# Patient Record
Sex: FEMALE | Race: WHITE | NOT HISPANIC OR LATINO | Employment: FULL TIME | URBAN - METROPOLITAN AREA
[De-identification: names, ages, dates, MRNs, and addresses within clinical notes are randomized per-mention and may not be internally consistent; named-entity substitution may affect disease eponyms.]

---

## 2017-04-17 ENCOUNTER — HOSPITAL ENCOUNTER (EMERGENCY)
Facility: HOSPITAL | Age: 47
Discharge: HOME | End: 2017-04-17

## 2017-04-17 VITALS
RESPIRATION RATE: 16 BRPM | TEMPERATURE: 98 F | HEART RATE: 87 BPM | DIASTOLIC BLOOD PRESSURE: 97 MMHG | WEIGHT: 150 LBS | OXYGEN SATURATION: 96 % | BODY MASS INDEX: 28.32 KG/M2 | HEIGHT: 61 IN | SYSTOLIC BLOOD PRESSURE: 137 MMHG

## 2017-04-17 DIAGNOSIS — M79.671 ACUTE FOOT PAIN, RIGHT: ICD-10-CM

## 2017-04-17 PROCEDURE — 99283 EMERGENCY DEPT VISIT LOW MDM: CPT

## 2017-04-17 RX ORDER — HYDROCODONE BITARTRATE AND ACETAMINOPHEN 7.5; 325 MG/1; MG/1
1 TABLET ORAL EVERY 6 HOURS PRN
Qty: 6 TABLET | Refills: 0 | Status: SHIPPED | OUTPATIENT
Start: 2017-04-17

## 2017-04-17 NOTE — ED AVS SNAPSHOT
[facility]  [address]  [place] [address]               Deana Nielsen   2017  9:48 AM   ED    Description:  Female : 1970   Department:  [facility]            Your Care was Coordinated By:     Provider Role From To    Korina Murphy PA-C Physician Assistant 17 0948 --      Reason for Visit     Foot Pain           Diagnoses this Visit        Comments    Acute foot pain, right           ED Disposition     ED Disposition Condition Comment    Discharge             To Do List           Follow-up Information     Follow up with Jojo Cordero MD In 2 days.    Specialty:  Internal Medicine    Contact information:    [address]  [facility] [address]  [phone]         These Medications        Disp Refills Start End    hydrocodone-acetaminophen 7.5-325mg (NORCO) 7.5-325 mg per tablet 6 tablet 0 2017     Take 1 tablet by mouth every 6 (six) hours as needed for Pain. - Oral      [facility]     [facility] -  Assistance  Unless otherwise directed by your provider, please contact [facility], our nurse care line that is available for  assistance.     Registered nurses in the [facility] provide: appointment scheduling, clinical advisement, health education, and other advisory services.  Call: [phone] (toll free)               Medications           START taking these NEW medications        Refills    hydrocodone-acetaminophen 7.5-325mg (NORCO) 7.5-325 mg per tablet 0    Sig: Take 1 tablet by mouth every 6 (six) hours as needed for Pain.    Class: Print    Route: Oral      STOP taking these medications     hydrocodone-acetaminophen 5-325mg (NORCO) 5-325 mg per tablet Take 1 tablet by mouth every 4 (four) hours as needed.           Verify that the below list of medications is an accurate representation of the medications you are currently taking.  If none  "reported, the list may be blank. If incorrect, please contact your healthcare provider. Carry this list with you in case of emergency.           Current Medications     ferrous gluconate (FERGON) 325 MG Tab Take 1 tablet (325 mg total) by mouth 2 (two) times daily with meals.    hydrocodone-acetaminophen 7.5-325mg (NORCO) 7.5-325 mg per tablet Take 1 tablet by mouth every 6 (six) hours as needed for Pain.           Clinical Reference Information           Your Vitals Were     BP Pulse Temp Resp Height Weight    137/97 (BP Location: Right arm, Patient Position: Sitting) 87 98 °F (36.7 °C) (Oral) 16 5' 1" (1.549 m) 68 kg (150 lb)    SpO2 BMI             96% 28.34 kg/m2         Allergies as of 4/17/2017        Reactions    Nsaids (Non-steroidal Anti-inflammatory Drug)     Hives      Immunizations Administered on Date of Encounter - 4/17/2017     None      ED Micro, Lab, POCT     None      ED Imaging Orders     Start Ordered       Status Ordering Provider    04/17/17 1005 04/17/17 1004  X-Ray Foot Complete Right  1 time imaging      Final result       Discharge References/Attachments     ARTHRALGIA (ENGLISH)      Smoking Cessation     If you would like to quit smoking:   You may be eligible for free services if you are a [place] resident and started smoking cigarettes before September 1, 1988.  Call the Smoking Cessation [employer] ([facility]) toll free at [phone] or [phone].   Call 6-023-QUIT-NOW if you do not meet the above criteria.   Contact us via email: [email or web address]   View our website for more information: [email or web address]         [facility]  complies with applicable Federal civil rights laws and does not discriminate on the basis of race, color, national origin, age, disability, or sex.        Language Assistance Services     ATTENTION: Language assistance services are available, free of charge. Please call [phone].      ATENCIÓN: laith Wong " disposición servicios gratuitos de asistencia lingüística. Lldee al [phone].     MARYURI Ý: N?u b?n nói Ti?ng Vi?t, có các d?ch v? h? tr? ngôn ng? mi?n phí dành cho b?n. G?i s? [phone].

## 2017-04-17 NOTE — ED NOTES
Patient examined, evaluated, and educated on discharge prescriptions and instructions by LATOYA. [facility]

## 2017-04-17 NOTE — ED PROVIDER NOTES
History      Chief Complaint   Patient presents with    Foot Pain     woke up yesterday with right foot pain, denies trauma       Review of patient's allergies indicates:   Allergen Reactions    Nsaids (non-steroidal anti-inflammatory drug)      Hives          HPI   HPI    4/17/2017, 10:03 AM   History obtained from the patient      History of Present Illness: Deana Nielsen is a 47 y.o. female patient who presents to the Emergency Department for right foot pain since waking yesterday.  Denies injury, fever, or hx of same. Symptoms are moderate in severity.     No further complaints or concerns at this time.           PCP: Jojo Cordero MD       Past Medical History:  Past Medical History:   Diagnosis Date    Menorrhagia     Polycystic ovarian disease     Rash          Past Surgical History:  Past Surgical History:   Procedure Laterality Date    HAND SURGERY      RIGHT     INNER EAR SURGERY             Family History:  Family History   Problem Relation Age of Onset    Multiple sclerosis Sister            Social History:  Social History     Social History Main Topics    Smoking status: Former Smoker    Smokeless tobacco: Not on file    Alcohol use No    Drug use: No    Sexual activity: Not on file       ROS     Review of Systems   Constitutional: Negative for chills and fever.   HENT: Negative for sore throat.    Respiratory: Negative for shortness of breath.    Cardiovascular: Negative for chest pain.   Gastrointestinal: Negative for nausea.   Genitourinary: Negative for dysuria.   Musculoskeletal: Negative for back pain.   Skin: Negative for rash.   Neurological: Negative for weakness.   Hematological: Does not bruise/bleed easily.   All other systems reviewed and are negative.      Physical Exam      Initial Vitals   BP Pulse Resp Temp SpO2   04/17/17 0931 04/17/17 0931 04/17/17 0931 04/17/17 0931 04/17/17 0931   137/97 87 16 98 °F (36.7 °C) 96 %     Physical Exam  Vital signs and nursing  "notes reviewed.  Constitutional: Patient is in NAD. Awake and alert. Well-developed and well-nourished.  Head: Atraumatic. Normocephalic.  Eyes: PERRL. EOM intact. Conjunctivae nl. No scleral icterus.  ENT: Mucous membranes are moist. Oropharynx is clear.  Neck: Supple. No JVD. No lymphadenopathy.  No meningismus  Cardiovascular: Regular rate and rhythm. No murmurs, rubs, or gallops. Distal pulses are 2+ and symmetric.  Pulmonary/Chest: No respiratory distress. Clear to auscultation bilaterally. No wheezing, rales, or rhonchi.  Abdominal: Soft. Non-distended. No TTP. No rebound, guarding, or rigidity. Good bowel sounds.  Genitourinary: No CVA tenderness  Musculoskeletal: Moves all extremities.   Right foot with mild lateral edema.  No erythema or heat.  No break in skin.  TTP of dorsum of midfoot.  FROM of ankle and toes x 5.  2+ distal pulses.  No calf edema or tenderness.  Skin: Warm and dry.  Neurological: Awake and alert. No acute focal neurological deficits are appreciated.  Psychiatric: Normal affect. Good eye contact. Appropriate in content.      ED Course          Procedures  ED Vital Signs:  Vitals:    04/17/17 0931   BP: (!) 137/97   Pulse: 87   Resp: 16   Temp: 98 °F (36.7 °C)   TempSrc: Oral   SpO2: 96%   Weight: 68 kg (150 lb)   Height: 5' 1" (1.549 m)                 Imaging Results:  Imaging Results         X-Ray Foot Complete Right (Final result) Result time:  04/17/17 10:31:40    Final result by CAMILO Patrick Sr., MD (04/17/17 10:31:40)    Impression:         Normal study.      Electronically signed by: CAMILO PATRICK MD  Date:     04/17/17  Time:    10:31     Narrative:    3 view x-ray of the right foot    Clinical History:     Pain in right foot    Findings:     There is no fracture. There is no dislocation.                 The Emergency Provider reviewed the vital signs and test results, which are outlined above.    ED Discussion             Medication(s) given in the ER:  Medications - No data to " display        Follow-up Information     Follow up with Jojo Cordero MD In 2 days.    Specialty:  Internal Medicine    Contact information:    [address]  [facility] [address]  [phone]                Discharge Medication List as of 4/17/2017 10:46 AM      START taking these medications    Details   hydrocodone-acetaminophen 7.5-325mg (NORCO) 7.5-325 mg per tablet Take 1 tablet by mouth every 6 (six) hours as needed for Pain., Starting 4/17/2017, Until Discontinued, Print                Medical Decision Making        Rx crutches to facilitate discharge of patient on very busy day in ER.  She says she would rather rx crutches than wait.  All findings were reviewed with the patient/family in detail.   All remaining questions and concerns were addressed at that time.  Patient/family has been counseled regarding the need for follow-up as well as the indication to return to the emergency room should new or worrisome developments occur.        MDM               Clinical Impression:        ICD-10-CM ICD-9-CM   1. Acute foot pain, right M79.671 729.5             Korina Murphy PA-C  04/17/17 1803

## 2020-06-22 ENCOUNTER — HOSPITAL ENCOUNTER (EMERGENCY)
Facility: HOSPITAL | Age: 50
Discharge: HOME | End: 2020-06-22
Attending: FAMILY MEDICINE

## 2020-06-22 VITALS
BODY MASS INDEX: 28.34 KG/M2 | HEART RATE: 89 BPM | WEIGHT: 150 LBS | RESPIRATION RATE: 18 BRPM | TEMPERATURE: 98 F | SYSTOLIC BLOOD PRESSURE: 130 MMHG | DIASTOLIC BLOOD PRESSURE: 82 MMHG | OXYGEN SATURATION: 99 %
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
## 2020-06-22 DIAGNOSIS — S93.401A SPRAIN OF RIGHT ANKLE, UNSPECIFIED LIGAMENT, INITIAL ENCOUNTER: Primary | ICD-10-CM

## 2020-06-22 PROCEDURE — 63600175 PHARM REV CODE 636 W HCPCS: Performed by: NURSE PRACTITIONER

## 2020-06-22 PROCEDURE — 96372 THER/PROPH/DIAG INJ SC/IM: CPT

## 2020-06-22 PROCEDURE — 99284 EMERGENCY DEPT VISIT MOD MDM: CPT | Mod: 25

## 2020-06-22 RX ORDER — PROMETHAZINE HYDROCHLORIDE 25 MG/ML
25 INJECTION, SOLUTION INTRAMUSCULAR; INTRAVENOUS
Status: COMPLETED | OUTPATIENT
Start: 2020-06-22 | End: 2020-06-22

## 2020-06-22 RX ORDER — MORPHINE SULFATE 4 MG/ML
8 INJECTION, SOLUTION INTRAMUSCULAR; INTRAVENOUS
Status: COMPLETED | OUTPATIENT
Start: 2020-06-22 | End: 2020-06-22

## 2020-06-22 RX ORDER — CYCLOBENZAPRINE HCL 10 MG
10 TABLET ORAL 3 TIMES DAILY PRN
Qty: 15 TABLET | Refills: 0 | Status: SHIPPED | OUTPATIENT
Start: 2020-06-22 | End: 2020-06-27

## 2020-06-22 RX ADMIN — PROMETHAZINE HYDROCHLORIDE 25 MG: 25 INJECTION INTRAMUSCULAR; INTRAVENOUS at 01:06

## 2020-06-22 RX ADMIN — MORPHINE SULFATE 8 MG: 4 INJECTION INTRAVENOUS at 01:06

## 2020-06-22 NOTE — ED PROVIDER NOTES
"SCRIBE #1 NOTE: I, Alli Mark, am scribing for, and in the presence of, Walter Odonnell NP and Soheila Hu MD. I have scribed the entire note.       History     Chief Complaint   Patient presents with    Leg Pain     pt had a fall while trying to place her grand daughter in bed. pt stated she feel on her R leg and heard some 'shatters". swollen to R ankle noted. Pulse and sensation noted to that extremity      Review of patient's allergies indicates:   Allergen Reactions    Nsaids (non-steroidal anti-inflammatory drug)      Hives           History of Present Illness     HPI    6/22/2020, 1:32 AM  History obtained from the patient      History of Present Illness: Deana Nielsen is a 50 y.o. female patient who presents to the Emergency Department for evaluation of right lower leg pain which onset tonight while placing granddaughter in bed. Symptoms are constant and moderate in severity. No mitigating or exacerbating factors reported. Associated sxs include right ankle swelling. Patient denies any fever, chills, n/v, chest pain, SOB, weakness/numbness, other injury, and all other sxs at this time. Prior Tx includes none. No further complaints or concerns at this time.       Arrival mode: Personal transportation    PCP: Jojo Cordero MD      Past Medical History:  Past Medical History:   Diagnosis Date    Menorrhagia     Polycystic ovarian disease     Rash        Past Surgical History:  Past Surgical History:   Procedure Laterality Date    HAND SURGERY      RIGHT     INNER EAR SURGERY           Family History:  Family History   Problem Relation Age of Onset    Multiple sclerosis Sister        Social History:   Social History     Tobacco Use    Smoking status: Former Smoker   Substance and Sexual Activity    Alcohol use: No    Drug use: No    Sexual activity: Unknown         Review of Systems     Review of Systems   Constitutional: Negative for chills and fever.   HENT: Negative for sore " throat.    Respiratory: Negative for shortness of breath.    Cardiovascular: Negative for chest pain.   Gastrointestinal: Negative for nausea and vomiting.   Genitourinary: Negative for dysuria.   Musculoskeletal: Positive for arthralgias and myalgias. Negative for back pain.   Skin: Negative for rash.   Neurological: Negative for weakness and numbness.   Hematological: Does not bruise/bleed easily.   All other systems reviewed and are negative.       Physical Exam     Initial Vitals [06/22/20 0108]   BP Pulse Resp Temp SpO2   130/83 106 18 97.6 °F (36.4 °C) 99 %      MAP       --          Physical Exam  Nursing Notes and Vital Signs Reviewed.  Constitutional: Well-developed and well-nourished. Patient is in NAD.  Head: Atraumatic. Normocephalic.  Eyes: PERRL. EOM intact. Conjunctivae are not pale. No scleral icterus.  ENT: Mucous membranes are moist. Oropharynx is clear and symmetric.    Neck: Supple. Full ROM. No lymphadenopathy.  Cardiovascular: Regular rate. Regular rhythm. No murmurs, rubs, or gallops. Distal pulses are 2+ and symmetric.  Pulmonary/Chest: No respiratory distress. Clear to auscultation bilaterally. No wheezing or rales.  Abdominal: Soft and non-distended.  There is no tenderness.  No rebound, guarding, or rigidity. Good bowel sounds.  Genitourinary: No CVA tenderness  Musculoskeletal: Moves all extremities. No obvious deformities. No calf tenderness.  Right Leg: Right ankle swelling. Right big toe TTP. Achilles intact. NVI. Cap refill distally is <2 seconds. DP and PT pulses are equal and 2+ bilaterally. No motor deficit. No distal sensory deficit  Skin: Warm and dry.  Neurological:  Alert, awake, and appropriate.  Normal speech.  No acute focal neurological deficits are appreciated.  Psychiatric: Normal affect. Good eye contact. Appropriate in content.     ED Course   Orthopedic Injury    Date/Time: 6/22/2020 1:55 AM  Performed by: Soheila Hu MD  Authorized by: Soheila Hu MD      Consent Done?:  Yes  Universal Protocol:     Verbal consent obtained?: Yes      Risks and benefits: Risks, benefits and alternatives were discussed      Consent given by:  Patient    Patient states understanding of procedure being performed: Yes      Patient's understanding of procedure matches consent: Yes      Imaging studies available: Yes      Patient identity confirmed:   and MRN  Injury:     Injury location:  Ankle    Location details:  Right ankle    Injury type:  Soft tissue      Pre-procedure assessment:     Neurovascular status: Neurovascularly intact      Distal perfusion: normal      Neurological function: normal      Range of motion: normal        Selections made in this section will also lock the Injury type section above.:     Immobilization: ACE wrap.    Complications: No      Specimens: No      Implants: No    Post-procedure assessment:     Neurovascular status: Neurovascularly intact      Distal perfusion: normal      Neurological function: normal      Range of motion: splinted      Patient tolerance:  Patient tolerated the procedure well with no immediate complications      ED Vital Signs:  Vitals:    20 0108 20 0219   BP: 130/83 130/82   Pulse: 106 89   Resp: 18 18   Temp: 97.6 °F (36.4 °C) 97.6 °F (36.4 °C)   TempSrc: Oral    SpO2: 99%    Weight: 68 kg (150 lb)          Imaging Results          X-Ray Ankle Complete Right (Final result)  Result time 20 08:24:24    Final result by Gildardo hWitehead MD (20 08:24:24)                 Impression:      Nondisplaced posterior malleolar fracture of the distal tibia suspected.  Consider CT for further characterization.      Electronically signed by: Gildardo Whitehead MD  Date:    2020  Time:    08:24             Narrative:    EXAMINATION:  XR ANKLE COMPLETE 3 VIEW RIGHT    CLINICAL HISTORY:  XR ANKLE COMPLETE 3 VIEW RIGHTUnspecified fall, initial encounter    COMPARISON:  2020    FINDINGS:  Three views of the right  ankle were obtained.    Question nondisplaced posterior malleolar fracture of the distal tibia.  Consider CT for further characterization..  Bony mineralization is normal.  Mild soft tissue swelling.  Ankle mortise is intact.    No definite joint effusion.  Mild soft tissue swelling.                               X-Ray Foot Complete Right (Final result)  Result time 06/22/20 08:08:38    Final result by Gildardo Whitehead MD (06/22/20 08:08:38)                 Impression:      No acute fracture or dislocation.      Electronically signed by: Gildardo Whitehead MD  Date:    06/22/2020  Time:    08:08             Narrative:    EXAMINATION:  XR FOOT COMPLETE 3 VIEW RIGHT    CLINICAL HISTORY:  XR FOOT COMPLETE 3 VIEW RIGHTUnspecified fall, initial encounter    COMPARISON:  04/17/2017    FINDINGS:  Three views of the right foot were obtained.    No evidence of acute fracture or dislocation.  Bony mineralization is normal.  Mild soft tissue swelling.    Mild degenerative changes.  No joint effusion.  Mild irregularity seen within the distal fibula on lateral view.  If there is ankle pain, recommend dedicated views of the ankle.                             1:54 AM: Per ED physician, pt's xray ankle results: no acute fracture.    1:54 AM: Per ED physician, pt's xray foot results: no acute fracture.          The Emergency Provider reviewed the vital signs and test results, which are outlined above.     ED Discussion     1:35 AM: Walter Odonnell NP transfers care of pt to Dr. Hu pending imaging results.    1:56 AM: Reassessed pt at this time.  Pt states her condition has improved at this time. Discussed with pt all pertinent ED information and results. Discussed pt dx and plan of tx. Gave pt all f/u and return to the ED instructions. All questions and concerns were addressed at this time. Pt expresses understanding of information and instructions, and is comfortable with plan to discharge. Pt is stable for discharge.    I discussed  with patient and/or family/caretaker that evaluation in the ED does not suggest any emergent or life threatening medical conditions requiring immediate intervention beyond what was provided in the ED, and I believe patient is safe for discharge.  Regardless, an unremarkable evaluation in the ED does not preclude the development or presence of a serious of life threatening condition. As such, patient was instructed to return immediately for any worsening or change in current symptoms.    I discussed with patient and/or family/caretaker that negative X-ray does not rule out occult fracture or other soft tissue injury.  Persistent pain greater than 7-10 days or increased pain requires follow up, specifically with orthopedics.        MDM        Medical Decision Making:   Clinical Tests:   Radiological Study: Ordered and Reviewed           ED Medication(s):  Medications   morphine injection 8 mg (8 mg Intramuscular Given 6/22/20 0120)   promethazine injection 25 mg (25 mg Intramuscular Given 6/22/20 0120)       Discharge Medication List as of 6/22/2020  1:55 AM          Follow-up Information     Schedule an appointment as soon as possible for a visit  with Jojo Cordero MD.    Specialty: Internal Medicine  Contact information:  [address] [id number] [phone]                       Scribe Attestation:   Scribe #1: I performed the above scribed service and the documentation accurately describes the services I performed. I attest to the accuracy of the note.     Attending:   Physician Attestation Statement for Scribe #1: I, Walter Odonnell NP, personally performed the services described in this documentation, as scribed by Alli Flroes, in my presence, and it is both accurate and complete.       Scribe Attestation:   Scribe #2: I performed the above scribed service and the documentation accurately describes the services I performed. I attest to the accuracy of the note.    Attending  Attestation:           Physician Attestation for Scribe:    Physician Attestation Statement for Scribe #2: I, Soheila Hu MD, reviewed documentation, as scribed by Alli Flores in my presence, and it is both accurate and complete. I also acknowledge and confirm the content of the note done by Scribe #1.           Clinical Impression       ICD-10-CM ICD-9-CM   1. Sprain of right ankle, unspecified ligament, initial encounter  S93.401A 845.00   2. Fall  W19.XXXA E888.9       Disposition:   Disposition: Discharged  Condition: Stable         Soheila Hu MD  06/23/20 0808

## 2020-06-24 ENCOUNTER — TELEPHONE (OUTPATIENT)
Dept: EMERGENCY MEDICINE | Facility: HOSPITAL | Age: 50
End: 2020-06-24

## 2020-06-24 ENCOUNTER — HOSPITAL ENCOUNTER (EMERGENCY)
Facility: HOSPITAL | Age: 50
Discharge: HOME | End: 2020-06-24
Attending: FAMILY MEDICINE

## 2020-06-24 VITALS
RESPIRATION RATE: 16 BRPM | OXYGEN SATURATION: 98 % | HEART RATE: 66 BPM | HEIGHT: 61 IN | SYSTOLIC BLOOD PRESSURE: 140 MMHG | DIASTOLIC BLOOD PRESSURE: 76 MMHG | TEMPERATURE: 98 F | BODY MASS INDEX: 28.89 KG/M2 | WEIGHT: 153 LBS

## 2020-06-24 DIAGNOSIS — S82.399A: Primary | ICD-10-CM

## 2020-06-24 DIAGNOSIS — S92.314A CLOSED NONDISPLACED FRACTURE OF FIRST METATARSAL BONE OF RIGHT FOOT, INITIAL ENCOUNTER: ICD-10-CM

## 2020-06-24 PROCEDURE — 99284 EMERGENCY DEPT VISIT MOD MDM: CPT | Mod: 25

## 2020-06-24 PROCEDURE — 29515 APPLICATION SHORT LEG SPLINT: CPT | Mod: RT

## 2020-06-24 PROCEDURE — 25000003 PHARM REV CODE 250: Performed by: PHYSICIAN ASSISTANT

## 2020-06-24 RX ORDER — HYDROCODONE BITARTRATE AND ACETAMINOPHEN 10; 325 MG/1; MG/1
1 TABLET ORAL
Status: COMPLETED | OUTPATIENT
Start: 2020-06-24 | End: 2020-06-24

## 2020-06-24 RX ORDER — HYDROCODONE BITARTRATE AND ACETAMINOPHEN 5; 325 MG/1; MG/1
1 TABLET ORAL EVERY 4 HOURS PRN
Qty: 12 TABLET | Refills: 0 | Status: SHIPPED | OUTPATIENT
Start: 2020-06-24

## 2020-06-24 RX ORDER — OXYCODONE AND ACETAMINOPHEN 10; 325 MG/1; MG/1
1 TABLET ORAL
Status: COMPLETED | OUTPATIENT
Start: 2020-06-24 | End: 2020-06-24

## 2020-06-24 RX ADMIN — OXYCODONE HYDROCHLORIDE AND ACETAMINOPHEN 1 TABLET: 10; 325 TABLET ORAL at 04:06

## 2020-06-24 RX ADMIN — HYDROCODONE BITARTRATE AND ACETAMINOPHEN 1 TABLET: 10; 325 TABLET ORAL at 11:06

## 2020-06-24 NOTE — ED PROVIDER NOTES
Encounter Date: 6/24/2020       History     Chief Complaint   Patient presents with    Foot Injury     right foot injury 3 days ago, was called back today and told that her foot was broken and she needed to return to the ED     The history is provided by the patient.   Leg Pain   The incident occurred in the street. The incident occurred yesterday. The pain is present in the right foot and right ankle. The quality of the pain is described as aching and throbbing. The pain is at a severity of 3/10. The pain has been constant since onset. Pertinent negatives include no numbness, no inability to bear weight, no loss of motion, no muscle weakness, no loss of sensation and no tingling. She reports no foreign bodies present. The symptoms are aggravated by activity, bearing weight and palpation. She has tried nothing for the symptoms.     Review of patient's allergies indicates:   Allergen Reactions    Citrus and derivatives     Nsaids (non-steroidal anti-inflammatory drug)      Hives       Past Medical History:   Diagnosis Date    Menorrhagia     Polycystic ovarian disease     Rash      Past Surgical History:   Procedure Laterality Date    HAND SURGERY      RIGHT     INNER EAR SURGERY       Family History   Problem Relation Age of Onset    Multiple sclerosis Sister      Social History     Tobacco Use    Smoking status: Former Smoker   Substance Use Topics    Alcohol use: No    Drug use: No     Review of Systems   Constitutional: Negative for chills and fever.   HENT: Negative for sore throat.    Eyes: Negative for photophobia and redness.   Respiratory: Negative for cough and shortness of breath.    Cardiovascular: Negative for chest pain.   Gastrointestinal: Negative for abdominal pain, diarrhea and nausea.   Endocrine: Negative for polydipsia and polyphagia.   Genitourinary: Negative for dysuria.   Musculoskeletal: Negative for arthralgias, back pain and myalgias.        Right foot and ankle pain     Skin:  Negative for rash.   Neurological: Negative for tingling, weakness, numbness and headaches.   Hematological: Does not bruise/bleed easily.   Psychiatric/Behavioral: The patient is not nervous/anxious.    All other systems reviewed and are negative.      Physical Exam     Initial Vitals [06/24/20 0940]   BP Pulse Resp Temp SpO2   123/76 81 16 98.6 °F (37 °C) 96 %      MAP       --         Physical Exam    Nursing note and vitals reviewed.  Constitutional: Vital signs are normal. She appears well-developed and well-nourished. No distress.   HENT:   Head: Normocephalic and atraumatic.   Right Ear: External ear normal.   Left Ear: External ear normal.   Nose: Nose normal.   Mouth/Throat: Oropharynx is clear and moist.   Eyes: Conjunctivae, EOM and lids are normal. Pupils are equal, round, and reactive to light.   Neck: Normal range of motion and full passive range of motion without pain. Neck supple.   Cardiovascular: Normal rate, regular rhythm, S1 normal, S2 normal, normal heart sounds, intact distal pulses and normal pulses.   Pulmonary/Chest: Breath sounds normal. No respiratory distress. She has no wheezes. She has no rales.   Abdominal: Soft. Normal appearance and bowel sounds are normal. She exhibits no distension. There is no abdominal tenderness.   Musculoskeletal:        Right ankle: She exhibits decreased range of motion and swelling. She exhibits no ecchymosis, no deformity, no laceration and normal pulse. Tenderness. Posterior TFL tenderness found. No lateral malleolus, no medial malleolus, no AITFL, no CF ligament, no head of 5th metatarsal and no proximal fibula tenderness found. Achilles tendon normal.   Lymphadenopathy:     She has no cervical adenopathy.   Neurological: She is alert and oriented to person, place, and time. She has normal strength. No cranial nerve deficit or sensory deficit. Coordination and gait normal.   Skin: Skin is warm, dry and intact.   Psychiatric: She has a normal mood and  affect. Her speech is normal and behavior is normal. Judgment and thought content normal. Cognition and memory are normal.         ED Course   Splint Application    Date/Time: 6/24/2020 4:49 PM  Performed by: PIEDAD West  Authorized by: Soheila Hu MD   Location details: right ankle  Splint type: ankle stirrup (short leg)  Supplies used: cotton padding,  elastic bandage and Ortho-Glass  Post-procedure: The splinted body part was neurovascularly unchanged following the procedure.  Patient tolerance: Patient tolerated the procedure well with no immediate complications        Labs Reviewed   HIV 1 / 2 ANTIBODY          Imaging Results          CT Ankle (Including Hindfoot) Without Contrast Right (Final result)  Result time 06/24/20 10:54:24    Final result by Shakir Johnson MD (06/24/20 10:54:24)                 Impression:      Acute, comminuted fracture of the posterior tibial plafond without significant displacement.  Fracture planes extend to the tibiotalar articulation and medial malleolus.  Additional tiny avulsion type fracture appreciated of the anterior-inferior tibia adjacent at the syndesmosis.    Partially visualized, acute displaced fracture of the 1st metacarpal base.  No significant widening of the tarsometatarsal joint.  Correlation for Lisfranc injury and/or further evaluation with MRI is recommended.    Tiny ossific focus appreciated just medial to the calcaneus with unknown donor site.    All CT scans at this facility use dose modulation, iterative reconstruction, and/or weight based dosing when appropriate to reduce radiation dose to as low as reasonable achievable.      Electronically signed by: Shakir Johnson  Date:    06/24/2020  Time:    10:54             Narrative:    EXAMINATION:  CT ANKLE (INCLUDING HINDFOOT) WITHOUT CONTRAST RIGHT    CLINICAL HISTORY:  Fracture, ankle;    TECHNIQUE:  Axial CT images of the right ankle without the administration of intravenous  contrast.    COMPARISON:  Right ankle radiograph 06/22/2012    FINDINGS:  Comminuted, nondisplaced fracture of the posterior distal tibial.  Fracture planes extend to the tibiotalar articular surface and into the medial malleolus.  Additional tiny fracture fragments appreciated involving the anterolateral aspect of the distal tibia.  The fibula appears intact.  The lateral malleolus appears intact.  Joint alignment is satisfactory.  The talus is maintained without evidence of osteochondral defect.  There is a partially visualized acute and displaced fracture of the 1st metacarpal base.  No evidence of significant widening of the tarsal metatarsal joint.  Additional tiny ossific focus appreciated just medial to the calcaneus.  No obvious donor site.    Prominent soft tissue swelling.                                 Medical Decision Making:   ED Management:  Discussed case with Dr. Rizo who asks that we place an ankle stirrup and posterior splint on RLE and non weight bearing.  Pt to follu up in 1 week.                                 Clinical Impression:       ICD-10-CM ICD-9-CM   1. Other closed fracture of distal end of tibia, unspecified laterality, initial encounter  S82.399A 824.8   2. Closed nondisplaced fracture of first metatarsal bone of right foot, initial encounter  S92.314A 825.25         Disposition:   Disposition: Discharged  Condition: Stable                        PIEDAD West  06/24/20 8541

## 2020-06-24 NOTE — ED NOTES
Patient sitting up in bed, no acute distress noted, awake, alert, and oriented x 3, calm, respirations even and unlabored, and skin is warm and dry. Patient updated on status and plan of care. Side rails up x 2, call light in reach, bed low and locked. Denies any needs at this time. Will continue to monitor.

## 2020-06-25 ENCOUNTER — TELEPHONE (OUTPATIENT)
Dept: ORTHOPEDICS | Facility: CLINIC | Age: 50
End: 2020-06-25

## 2020-06-26 ENCOUNTER — OFFICE VISIT (OUTPATIENT)
Dept: ORTHOPEDICS | Facility: CLINIC | Age: 50
End: 2020-06-26

## 2020-06-26 VITALS
HEIGHT: 63 IN | HEART RATE: 81 BPM | WEIGHT: 153 LBS | BODY MASS INDEX: 27.11 KG/M2 | SYSTOLIC BLOOD PRESSURE: 129 MMHG | DIASTOLIC BLOOD PRESSURE: 89 MMHG

## 2020-06-26 DIAGNOSIS — S82.301A NONDISPLACED FRACTURE OF DISTAL END OF RIGHT TIBIA: Primary | ICD-10-CM

## 2020-06-26 DIAGNOSIS — M25.571 RIGHT ANKLE PAIN, UNSPECIFIED CHRONICITY: ICD-10-CM

## 2020-06-26 DIAGNOSIS — M79.671 RIGHT FOOT PAIN: Primary | ICD-10-CM

## 2020-06-26 DIAGNOSIS — S92.334A NONDISPLACED FRACTURE OF THIRD METATARSAL BONE, RIGHT FOOT, INITIAL ENCOUNTER FOR CLOSED FRACTURE: ICD-10-CM

## 2020-06-26 DIAGNOSIS — S92.311A DISPLACED FRACTURE OF FIRST METATARSAL BONE, RIGHT FOOT, INITIAL ENCOUNTER FOR CLOSED FRACTURE: ICD-10-CM

## 2020-06-26 PROCEDURE — 99213 OFFICE O/P EST LOW 20 MIN: CPT | Mod: PBBFAC | Performed by: ORTHOPAEDIC SURGERY

## 2020-06-26 PROCEDURE — 99999 PR PBB SHADOW E&M-EST. PATIENT-LVL III: ICD-10-PCS | Mod: PBBFAC,,, | Performed by: ORTHOPAEDIC SURGERY

## 2020-06-26 PROCEDURE — 99999 PR PBB SHADOW E&M-EST. PATIENT-LVL III: CPT | Mod: PBBFAC,,, | Performed by: ORTHOPAEDIC SURGERY

## 2020-06-26 RX ORDER — DIPHENHYDRAMINE HCL 25 MG
CAPSULE ORAL
COMMUNITY
Start: 2020-04-23

## 2020-06-26 RX ORDER — EPINEPHRINE 0.3 MG/.3ML
INJECTION SUBCUTANEOUS
COMMUNITY
Start: 2020-04-28 | End: 2021-04-28

## 2020-06-26 RX ORDER — AMLODIPINE BESYLATE 10 MG/1
10 TABLET ORAL DAILY
COMMUNITY
Start: 2019-10-02

## 2020-06-26 RX ORDER — PANTOPRAZOLE SODIUM 40 MG/1
40 TABLET, DELAYED RELEASE ORAL DAILY
COMMUNITY
Start: 2020-05-18

## 2020-06-26 RX ORDER — HYDROCODONE BITARTRATE AND ACETAMINOPHEN 5; 325 MG/1; MG/1
1 TABLET ORAL EVERY 4 HOURS PRN
Qty: 30 TABLET | Refills: 0 | Status: SHIPPED | OUTPATIENT
Start: 2020-06-26

## 2020-06-26 RX ORDER — IBUPROFEN 200 MG
1 TABLET ORAL DAILY
COMMUNITY
Start: 2020-05-28

## 2020-06-26 RX ORDER — CHOLECALCIFEROL (VITAMIN D3) 25 MCG
1000 TABLET ORAL DAILY
COMMUNITY
Start: 2019-10-02 | End: 2020-09-26

## 2020-06-26 RX ORDER — HYDROXYZINE HYDROCHLORIDE 50 MG/1
50 TABLET, FILM COATED ORAL DAILY
COMMUNITY
Start: 2019-12-13

## 2020-06-26 NOTE — PROGRESS NOTES
Subjective:     Patient ID: Deana Nielsen is a 50 y.o. female.    Chief Complaint: Pain of the Right Foot and Pain of the Right Ankle      HPI:   50-year-old female referred by the emergency department for multiple fractures of her right ankle and foot which occurred 4 days ago.      Past Medical History:   Diagnosis Date    Menorrhagia     Polycystic ovarian disease     Rash      Past Surgical History:   Procedure Laterality Date    HAND SURGERY      RIGHT     INNER EAR SURGERY       Review of patient's allergies indicates:   Allergen Reactions    Citrus and derivatives     Nsaids (non-steroidal anti-inflammatory drug)      Hives        ROS       Objective:     Vitals:    06/26/20 0936   BP: 129/89   Pulse: 81      General    Nursing note and vitals reviewed.  Constitutional: She is oriented to person, place, and time. She appears well-developed and well-nourished.   HENT:   Head: Normocephalic and atraumatic.   Eyes: EOM are normal.   Neck: Normal range of motion.   Cardiovascular: Normal rate, regular rhythm and intact distal pulses.    Pulmonary/Chest: Effort normal. No respiratory distress. She exhibits no tenderness.   Abdominal: Soft. She exhibits no distension. There is no abdominal tenderness.   Neurological: She is alert and oriented to person, place, and time.   Psychiatric: She has a normal mood and affect. Her behavior is normal. Judgment and thought content normal.         Right Ankle/Foot Exam     Inspection   Deformity: absent  Erythema: absent  Bruising: Ankle - present Foot - present  Effusion: Ankle - present Foot - present    Swelling   The patient is swollen on the great toe metatarsophalangeal joint, metatarsals, navicular, plantar arch, lateral malleolus, lateral talar dome, medial malleolus, medial talar dome, peroneals, posterior tibial tendon and subtalar joint.    Tenderness   The patient is tender to palpation of the metatarsals, navicular, plantar arch, ATF, CF ligament,  lateral malleolus, medial malleolus, peroneals, posterior tibial tendon and subtalar joint.    Pain   The patient exhibits pain of the metatarsals, navicular, lateral malleolus, lateral talar dome, medial malleolus, medial talar dome, peroneals and posterior tibial tendon.    Range of Motion   Ankle Joint   Dorsiflexion: abnormal   Plantar flexion: abnormal   Subtalar Joint   Inversion: abnormal   Eversion: abnormal   First MTP Joint: normal    Muscle Strength   The patient has normal right ankle strength.    Other   Sensation: normal    Comments:  Skin remains warm dry and intact at this time without deformity.  Multiple areas of ecchymoses are noted about the ankle as well as the dorsal and plantar arch and the metatarsophalangeal joints.  No sign of infection or compartment syndrome is noted at this time there is no sign of significant deformity appreciated.        Vascular Exam     Right Pulses  Dorsalis Pedis:      1+  Posterior Tibial:      1+        Edema  Right Lower Leg: present       Imaging:  X-ray and CT of the right lower extremity shows nondisplaced fracture about the distal tibia and fracture of the base of the 1st metatarsal and 3rd metatarsal with minor displacement of the 1st and no displacement of the 3rd.  -please see radiologist dictation for complete report    Assessment / Plan:  We have placed the patient into a CAM walker today.  She is informed to continue utilizing the Cam walker at all times for the next 3 weeks.  She will present back for an additional x-ray in 3 weeks at which point we will evaluate her progress to determine if she can go to toe-touch weight-bearing and removed the boot periodically for range of motion exercises both to the mid foot and the ankle.  I have instructed her to be nonweightbearing a total of 6 weeks with progressive weight-bearing thereafter.  Progressive weight-bearing is recommended to start at 25% at 6 weeks time progressing 25% every 2 weeks to a maximum  of full weight-bearing at 3 months.  I have reviewed this with the patient and her daughter who voiced understanding today.  We have discussed assistive devices for ambulation including crutches, walker and knee walker as well as wheelchair.  The risks, benefits and alternatives of each device are discussed the patient will make her own decision regarding which device works best for her.     Encounter Diagnoses   Name Primary?    Nondisplaced fracture of distal end of right tibia Yes    Displaced fracture of first metatarsal bone, right foot, initial encounter for closed fracture     Nondisplaced fracture of third metatarsal bone, right foot, initial encounter for closed fracture            Follow-up:  In 6 weeks      -Discussed findings with patient  -Treatment options and alternatives were discussed with the patient. Patient expressed understanding. Patient was given the opportunity to ask questions and be an active participant in their medical care. Patient had no further questions or concerns at this time.     Disclaimer: This note was generated using a voice recognition system and may have sound alike errors within the text.

## 2020-07-17 ENCOUNTER — OFFICE VISIT (OUTPATIENT)
Dept: ORTHOPEDICS | Facility: CLINIC | Age: 50
End: 2020-07-17

## 2020-07-17 ENCOUNTER — HOSPITAL ENCOUNTER (OUTPATIENT)
Dept: RADIOLOGY | Facility: HOSPITAL | Age: 50
Discharge: HOME | End: 2020-07-17
Attending: ORTHOPAEDIC SURGERY

## 2020-07-17 VITALS
HEART RATE: 62 BPM | SYSTOLIC BLOOD PRESSURE: 135 MMHG | WEIGHT: 153 LBS | DIASTOLIC BLOOD PRESSURE: 89 MMHG | BODY MASS INDEX: 27.11 KG/M2 | HEIGHT: 63 IN

## 2020-07-17 DIAGNOSIS — S92.334A NONDISPLACED FRACTURE OF THIRD METATARSAL BONE, RIGHT FOOT, INITIAL ENCOUNTER FOR CLOSED FRACTURE: ICD-10-CM

## 2020-07-17 DIAGNOSIS — S92.311A DISPLACED FRACTURE OF FIRST METATARSAL BONE, RIGHT FOOT, INITIAL ENCOUNTER FOR CLOSED FRACTURE: ICD-10-CM

## 2020-07-17 DIAGNOSIS — M79.671 RIGHT FOOT PAIN: ICD-10-CM

## 2020-07-17 DIAGNOSIS — S82.301A NONDISPLACED FRACTURE OF DISTAL END OF RIGHT TIBIA: Primary | ICD-10-CM

## 2020-07-17 DIAGNOSIS — M25.571 RIGHT ANKLE PAIN, UNSPECIFIED CHRONICITY: ICD-10-CM

## 2020-07-17 DIAGNOSIS — M79.671 RIGHT FOOT PAIN: Primary | ICD-10-CM

## 2020-07-17 PROCEDURE — 73610 XR ANKLE COMPLETE 3 VIEW RIGHT: ICD-10-PCS | Mod: 26,RT,, | Performed by: RADIOLOGY

## 2020-07-17 PROCEDURE — 99999 PR PBB SHADOW E&M-EST. PATIENT-LVL III: ICD-10-PCS | Mod: PBBFAC,,, | Performed by: PHYSICIAN ASSISTANT

## 2020-07-17 PROCEDURE — 99999 PR PBB SHADOW E&M-EST. PATIENT-LVL III: CPT | Mod: PBBFAC,,, | Performed by: PHYSICIAN ASSISTANT

## 2020-07-17 PROCEDURE — 73630 X-RAY EXAM OF FOOT: CPT | Mod: 26,RT,, | Performed by: RADIOLOGY

## 2020-07-17 PROCEDURE — 73610 X-RAY EXAM OF ANKLE: CPT | Mod: 26,RT,, | Performed by: RADIOLOGY

## 2020-07-17 PROCEDURE — 73610 X-RAY EXAM OF ANKLE: CPT | Mod: TC,RT

## 2020-07-17 PROCEDURE — 99213 OFFICE O/P EST LOW 20 MIN: CPT | Mod: PBBFAC,25 | Performed by: PHYSICIAN ASSISTANT

## 2020-07-17 PROCEDURE — 73630 X-RAY EXAM OF FOOT: CPT | Mod: TC,RT

## 2020-07-17 PROCEDURE — 73630 XR FOOT COMPLETE 3 VIEW RIGHT: ICD-10-PCS | Mod: 26,RT,, | Performed by: RADIOLOGY

## 2020-07-17 NOTE — PROGRESS NOTES
Subjective:     Patient ID: Deana Nielsen is a 50 y.o. female.    Chief Complaint: Pain of the Right Ankle and Pain of the Right Foot      HPI:   50-year-old female following up for a right nondisplaced distal tibia fracture and a displaced 1st metatarsal fracture and a nondisplaced 3rd metatarsal fracture.  Patient states she has had very little pain.  She remains faithful to her nonweightbearing status in her Cam boot.  She has not begun range of motion exercises yet.       Past Medical History:   Diagnosis Date    Menorrhagia     Polycystic ovarian disease     Rash      Past Surgical History:   Procedure Laterality Date    HAND SURGERY      RIGHT     INNER EAR SURGERY       Review of patient's allergies indicates:   Allergen Reactions    Citrus and derivatives     Nsaids (non-steroidal anti-inflammatory drug)      Hives        Review of Systems   Constitutional: Negative.    Eyes: Negative.    Respiratory: Negative.    Cardiovascular: Negative.    Musculoskeletal: Negative.    Skin: Negative.    Neurological: Negative.    Psychiatric/Behavioral: Negative.           Objective:     Vitals:    07/17/20 1335   BP: 135/89   Pulse: 62      General    Constitutional: She is oriented to person, place, and time. She appears well-developed and well-nourished.   HENT:   Head: Normocephalic and atraumatic.   Eyes: Conjunctivae are normal.   Neck: Normal range of motion.   Cardiovascular: Normal rate.    Pulmonary/Chest: Effort normal.   Abdominal: She exhibits no distension.   Neurological: She is alert and oriented to person, place, and time.   Psychiatric: She has a normal mood and affect. Her behavior is normal. Judgment and thought content normal.         Right Ankle/Foot Exam     Range of Motion   Ankle Joint   Dorsiflexion: abnormal   Plantar flexion: abnormal   Subtalar Joint   Inversion: abnormal   Eversion: abnormal     Other   Sensation: normal    Comments:  Tender palpation at the area of the 1st  metatarsal base.  Minor edema           Imaging:  X-ray of the right foot ankle shows fracture about the distal tibia with alignment maintained.  Fractures about the 1st metatarsal with overall alignment maintained.  Fracture about the 3rd metatarsal with overall alignment unchanged.  No callus formation yet seen.  -please see radiologist dictation for complete report    Assessment / Plan:     Encounter Diagnoses   Name Primary?    Nondisplaced fracture of distal end of right tibia Yes    Displaced fracture of first metatarsal bone, right foot, initial encounter for closed fracture     Nondisplaced fracture of third metatarsal bone, right foot, initial encounter for closed fracture      Overall fracture alignment maintained at patient's 3 separate fracture sites.  She will continue nonweightbearing and in a Cam boot.  She may begin plantar and dorsiflexion exercises of her right ankle twice daily letting pain be her guide.  Repeat x-rays will be taken in 3 weeks home should fracture alignment to be maintained and bone healing indicated patient's weight-bearing status will be transitioned to 25%.      Follow-up:  In 3 weeks    -Discussed findings with patient  -Treatment options and alternatives were discussed with the patient. Patient expressed understanding. Patient was given the opportunity to ask questions and be an active participant in their medical care. Patient had no further questions or concerns at this time.     Disclaimer: This note was generated using a voice recognition system and may have sound alike errors within the text.

## 2020-08-07 ENCOUNTER — HOSPITAL ENCOUNTER (OUTPATIENT)
Dept: RADIOLOGY | Facility: HOSPITAL | Age: 50
Discharge: HOME | End: 2020-08-07
Attending: PHYSICIAN ASSISTANT

## 2020-08-07 ENCOUNTER — OFFICE VISIT (OUTPATIENT)
Dept: ORTHOPEDICS | Facility: CLINIC | Age: 50
End: 2020-08-07

## 2020-08-07 VITALS
HEIGHT: 63 IN | BODY MASS INDEX: 27.11 KG/M2 | DIASTOLIC BLOOD PRESSURE: 88 MMHG | SYSTOLIC BLOOD PRESSURE: 136 MMHG | WEIGHT: 153 LBS | HEART RATE: 68 BPM

## 2020-08-07 DIAGNOSIS — M79.671 RIGHT FOOT PAIN: ICD-10-CM

## 2020-08-07 DIAGNOSIS — S92.334A NONDISPLACED FRACTURE OF THIRD METATARSAL BONE, RIGHT FOOT, INITIAL ENCOUNTER FOR CLOSED FRACTURE: ICD-10-CM

## 2020-08-07 DIAGNOSIS — M25.571 RIGHT ANKLE PAIN, UNSPECIFIED CHRONICITY: ICD-10-CM

## 2020-08-07 DIAGNOSIS — S92.311A DISPLACED FRACTURE OF FIRST METATARSAL BONE, RIGHT FOOT, INITIAL ENCOUNTER FOR CLOSED FRACTURE: ICD-10-CM

## 2020-08-07 DIAGNOSIS — S82.301A NONDISPLACED FRACTURE OF DISTAL END OF RIGHT TIBIA: Primary | ICD-10-CM

## 2020-08-07 DIAGNOSIS — M25.571 RIGHT ANKLE PAIN, UNSPECIFIED CHRONICITY: Primary | ICD-10-CM

## 2020-08-07 PROCEDURE — 99999 PR PBB SHADOW E&M-EST. PATIENT-LVL III: CPT | Mod: PBBFAC,,, | Performed by: PHYSICIAN ASSISTANT

## 2020-08-07 PROCEDURE — 73630 X-RAY EXAM OF FOOT: CPT | Mod: TC,RT

## 2020-08-07 PROCEDURE — 73610 X-RAY EXAM OF ANKLE: CPT | Mod: TC,RT

## 2020-08-07 PROCEDURE — 73630 XR FOOT COMPLETE 3 VIEW RIGHT: ICD-10-PCS | Mod: 26,RT,, | Performed by: RADIOLOGY

## 2020-08-07 PROCEDURE — 73610 X-RAY EXAM OF ANKLE: CPT | Mod: 26,RT,, | Performed by: RADIOLOGY

## 2020-08-07 PROCEDURE — 99213 OFFICE O/P EST LOW 20 MIN: CPT | Mod: PBBFAC,25 | Performed by: PHYSICIAN ASSISTANT

## 2020-08-07 PROCEDURE — 73610 XR ANKLE COMPLETE 3 VIEW RIGHT: ICD-10-PCS | Mod: 26,RT,, | Performed by: RADIOLOGY

## 2020-08-07 PROCEDURE — 99999 PR PBB SHADOW E&M-EST. PATIENT-LVL III: ICD-10-PCS | Mod: PBBFAC,,, | Performed by: PHYSICIAN ASSISTANT

## 2020-08-07 PROCEDURE — 73630 X-RAY EXAM OF FOOT: CPT | Mod: 26,RT,, | Performed by: RADIOLOGY

## 2020-08-07 NOTE — PROGRESS NOTES
Subjective:     Patient ID: Deana Nielsen is a 50 y.o. female.    Chief Complaint: No chief complaint on file.      HPI:   49yo F f/u for nondisplaced posterior malleolus, nondisplaced 3rd metatarsal fracture, displaced 1st metatarsal fracture which occurred on 06/24/2020.  At patient's last visit overall fracture alignments remained acceptable.  She was told to continue nonweightbearing.  Patient reports she has continued to be nonweightbearing and has had 0 pain.  She has continued to wear the Cam boot      Past Medical History:   Diagnosis Date    Menorrhagia     Polycystic ovarian disease     Rash      Past Surgical History:   Procedure Laterality Date    HAND SURGERY      RIGHT     INNER EAR SURGERY       Review of patient's allergies indicates:   Allergen Reactions    Citrus and derivatives     Nsaids (non-steroidal anti-inflammatory drug)      Hives        Review of Systems   Constitutional: Negative.    Eyes: Negative.    Respiratory: Negative.    Cardiovascular: Negative.    Musculoskeletal: Negative.    Skin: Negative.    Neurological: Negative.    Psychiatric/Behavioral: Negative.           Objective:   There were no vitals filed for this visit.   General    Constitutional: She is oriented to person, place, and time. She appears well-developed and well-nourished.   HENT:   Head: Normocephalic and atraumatic.   Eyes: Conjunctivae are normal.   Neck: Normal range of motion.   Cardiovascular: Normal rate.    Pulmonary/Chest: Effort normal.   Abdominal: She exhibits no distension.   Neurological: She is alert and oriented to person, place, and time.   Psychiatric: She has a normal mood and affect. Her behavior is normal. Judgment and thought content normal.         Right Ankle/Foot Exam     Range of Motion   Ankle Joint   Dorsiflexion: abnormal   Plantar flexion: abnormal   Subtalar Joint   Inversion: abnormal   Eversion: abnormal     Other   Sensation: normal    Comments:  Nontender palpation  at the fracture of the distal tibia.  Tender to palpation at the fracture site of the 1st and 3rd metatarsals.           Imaging:  X-ray of the right foot and ankle shows fractures about the posterior malleolus with overall alignment unchanged and blurring of fracture line.  Fracture about the 1st metatarsal base with increasing callus formation seen, fracture line about the 3rd metatarsal with blurring from prior.  -please see radiologist dictation for complete report    Assessment / Plan:     Encounter Diagnoses   Name Primary?    Nondisplaced fracture of distal end of right tibia Yes    Displaced fracture of first metatarsal bone, right foot, subsequent encounter for closed fracture     Nondisplaced fracture of third metatarsal bone, right foot, subsequent encounter for closed fracture      Fractures unchanged alignment with increase in interval healing seen.  Patient without tenderness to the tibia fracture.   to palpation at the area of the 3rd and 1st metatarsal fractures.  She was told she may begin weight-bearing at 25% letting pain be her guide.  Should she experience too much pain she should continue nonweightbearing and try to weight bear again in 1 week.  Should she be able to weightbear without pain she  will begin  in her Cam boot at 25% and increasing by 25% every 2 weeks until she will be weight-bearing as tolerated.  She will follow up in 6 weeks for repeat x-rays.      Follow-up:  In 6 weeks      -Discussed findings with patient  -Treatment options and alternatives were discussed with the patient. Patient expressed understanding. Patient was given the opportunity to ask questions and be an active participant in their medical care. Patient had no further questions or concerns at this time.     Disclaimer: This note was generated using a voice recognition system and may have sound alike errors within the text.

## 2020-09-18 ENCOUNTER — HOSPITAL ENCOUNTER (OUTPATIENT)
Dept: RADIOLOGY | Facility: HOSPITAL | Age: 50
Discharge: HOME | End: 2020-09-18
Attending: ORTHOPAEDIC SURGERY

## 2020-09-18 ENCOUNTER — OFFICE VISIT (OUTPATIENT)
Dept: ORTHOPEDICS | Facility: CLINIC | Age: 50
End: 2020-09-18

## 2020-09-18 VITALS
SYSTOLIC BLOOD PRESSURE: 122 MMHG | HEART RATE: 77 BPM | DIASTOLIC BLOOD PRESSURE: 80 MMHG | WEIGHT: 153 LBS | BODY MASS INDEX: 27.11 KG/M2 | HEIGHT: 63 IN

## 2020-09-18 DIAGNOSIS — S92.311A DISPLACED FRACTURE OF FIRST METATARSAL BONE, RIGHT FOOT, INITIAL ENCOUNTER FOR CLOSED FRACTURE: ICD-10-CM

## 2020-09-18 DIAGNOSIS — M79.671 RIGHT FOOT PAIN: ICD-10-CM

## 2020-09-18 DIAGNOSIS — S82.301A NONDISPLACED FRACTURE OF DISTAL END OF RIGHT TIBIA: Primary | ICD-10-CM

## 2020-09-18 DIAGNOSIS — M25.571 RIGHT ANKLE PAIN, UNSPECIFIED CHRONICITY: ICD-10-CM

## 2020-09-18 DIAGNOSIS — S92.334A NONDISPLACED FRACTURE OF THIRD METATARSAL BONE, RIGHT FOOT, INITIAL ENCOUNTER FOR CLOSED FRACTURE: ICD-10-CM

## 2020-09-18 PROCEDURE — 99999 PR PBB SHADOW E&M-EST. PATIENT-LVL III: ICD-10-PCS | Mod: PBBFAC,,, | Performed by: PHYSICIAN ASSISTANT

## 2020-09-18 PROCEDURE — 73630 X-RAY EXAM OF FOOT: CPT | Mod: 26,RT,, | Performed by: RADIOLOGY

## 2020-09-18 PROCEDURE — 73630 XR FOOT COMPLETE 3 VIEW RIGHT: ICD-10-PCS | Mod: 26,RT,, | Performed by: RADIOLOGY

## 2020-09-18 PROCEDURE — 73610 XR ANKLE COMPLETE 3 VIEW RIGHT: ICD-10-PCS | Mod: 26,RT,, | Performed by: RADIOLOGY

## 2020-09-18 PROCEDURE — 99999 PR PBB SHADOW E&M-EST. PATIENT-LVL III: CPT | Mod: PBBFAC,,, | Performed by: PHYSICIAN ASSISTANT

## 2020-09-18 PROCEDURE — 73630 X-RAY EXAM OF FOOT: CPT | Mod: TC,RT

## 2020-09-18 PROCEDURE — 73610 X-RAY EXAM OF ANKLE: CPT | Mod: 26,RT,, | Performed by: RADIOLOGY

## 2020-09-18 PROCEDURE — 73610 X-RAY EXAM OF ANKLE: CPT | Mod: TC,RT

## 2020-09-18 PROCEDURE — 99213 OFFICE O/P EST LOW 20 MIN: CPT | Mod: PBBFAC,25 | Performed by: PHYSICIAN ASSISTANT

## 2020-09-18 NOTE — PROGRESS NOTES
Subjective:     Patient ID: Deana Nielsen is a 50 y.o. female.    Chief Complaint: Post-op Evaluation of the Right Foot and Post-op Evaluation of the Right Ankle      HPI:   50-year-old female following up for a right nondisplaced distal tibia fracture, fracture of the 1st metatarsal, and fracture of the 3rd metatarsal.  At patient's last appointment she was transitioning to weight-bearing as tolerated in her Cam boot.  Today she has no reports of pain and has continued her Cam boot.      Past Medical History:   Diagnosis Date    Menorrhagia     Polycystic ovarian disease     Rash      Past Surgical History:   Procedure Laterality Date    HAND SURGERY      RIGHT     INNER EAR SURGERY       Review of patient's allergies indicates:   Allergen Reactions    Citrus and derivatives     Nsaids (non-steroidal anti-inflammatory drug)      Hives        Review of Systems   Constitutional: Negative.    Eyes: Negative.    Respiratory: Negative.    Cardiovascular: Negative.    Musculoskeletal: Negative.    Skin: Negative.    Neurological: Negative.    Psychiatric/Behavioral: Negative.           Objective:     Vitals:    09/18/20 1308   BP: 122/80   Pulse: 77      General    Constitutional: She is oriented to person, place, and time. She appears well-developed and well-nourished.   HENT:   Head: Normocephalic and atraumatic.   Eyes: Conjunctivae are normal.   Neck: Normal range of motion.   Cardiovascular: Normal rate.    Pulmonary/Chest: Effort normal.   Abdominal: She exhibits no distension.   Neurological: She is alert and oriented to person, place, and time.   Psychiatric: She has a normal mood and affect. Her behavior is normal. Judgment and thought content normal.         Right Ankle/Foot Exam     Inspection   Scars: absent  Deformity: absent  Bruising: Ankle - absent Foot - absent    Range of Motion   The patient has normal right ankle ROM.    Other   Sensation: normal    Comments:  Nontender palpation all 3  fracture sites  Neurovascular intact  Brisk capillary refill           Imaging:  X-ray of the right foot and ankle shows fracture of the distal tibia with fracture line unable to be seen.  Fracture of the 1st metatarsal base with alignment unchanged, interval healing seen.  Fracture of the 3rd metatarsal with fracture line no longer visible.  -please see radiologist dictation for complete report    Assessment / Plan:     Encounter Diagnoses   Name Primary?    Nondisplaced fracture of distal end of right tibia Yes    Nondisplaced fracture of third metatarsal bone, right foot, initial encounter for closed fracture     Displaced fracture of first metatarsal bone, right foot, initial encounter for closed fracture      Patient is 3 fractures of her right lower extremity appear to have healed.  She is pain free.  She will transition to weight-bearing as tolerated in a regular shoe, no restrictions in activity.  Follow up as needed      Follow-up:  As needed      -Discussed findings with patient  -Treatment options and alternatives were discussed with the patient. Patient expressed understanding. Patient was given the opportunity to ask questions and be an active participant in their medical care. Patient had no further questions or concerns at this time.     Disclaimer: This note was generated using a voice recognition system and may have sound alike errors within the text.

## 2021-05-06 ENCOUNTER — PATIENT MESSAGE (OUTPATIENT)
Dept: RESEARCH | Facility: HOSPITAL | Age: 51
End: 2021-05-06